# Patient Record
Sex: MALE | Race: ASIAN | NOT HISPANIC OR LATINO | Employment: STUDENT | URBAN - METROPOLITAN AREA
[De-identification: names, ages, dates, MRNs, and addresses within clinical notes are randomized per-mention and may not be internally consistent; named-entity substitution may affect disease eponyms.]

---
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
## 2022-06-25 ENCOUNTER — HEALTH MAINTENANCE LETTER (OUTPATIENT)
Age: 22
End: 2022-06-25

## 2022-11-20 ENCOUNTER — HEALTH MAINTENANCE LETTER (OUTPATIENT)
Age: 22
End: 2022-11-20

## 2023-07-08 ENCOUNTER — HEALTH MAINTENANCE LETTER (OUTPATIENT)
Age: 23
End: 2023-07-08

## 2023-09-02 ENCOUNTER — APPOINTMENT (OUTPATIENT)
Dept: GENERAL RADIOLOGY | Facility: CLINIC | Age: 23
End: 2023-09-02
Attending: STUDENT IN AN ORGANIZED HEALTH CARE EDUCATION/TRAINING PROGRAM
Payer: COMMERCIAL

## 2023-09-02 ENCOUNTER — HOSPITAL ENCOUNTER (EMERGENCY)
Facility: CLINIC | Age: 23
Discharge: HOME | End: 2023-09-03
Attending: STUDENT IN AN ORGANIZED HEALTH CARE EDUCATION/TRAINING PROGRAM | Admitting: STUDENT IN AN ORGANIZED HEALTH CARE EDUCATION/TRAINING PROGRAM
Payer: COMMERCIAL

## 2023-09-02 VITALS
TEMPERATURE: 98.3 F | DIASTOLIC BLOOD PRESSURE: 75 MMHG | OXYGEN SATURATION: 97 % | SYSTOLIC BLOOD PRESSURE: 112 MMHG | WEIGHT: 160 LBS | RESPIRATION RATE: 16 BRPM | BODY MASS INDEX: 22.96 KG/M2 | HEART RATE: 120 BPM

## 2023-09-02 DIAGNOSIS — M25.531 RIGHT WRIST PAIN: ICD-10-CM

## 2023-09-02 PROCEDURE — 99283 EMERGENCY DEPT VISIT LOW MDM: CPT | Mod: 27

## 2023-09-02 PROCEDURE — 99283 EMERGENCY DEPT VISIT LOW MDM: CPT | Performed by: STUDENT IN AN ORGANIZED HEALTH CARE EDUCATION/TRAINING PROGRAM

## 2023-09-02 PROCEDURE — 73110 X-RAY EXAM OF WRIST: CPT | Mod: RT

## 2023-09-02 PROCEDURE — 73130 X-RAY EXAM OF HAND: CPT | Mod: RT

## 2023-09-02 PROCEDURE — 73130 X-RAY EXAM OF HAND: CPT | Mod: 26 | Performed by: RADIOLOGY

## 2023-09-02 PROCEDURE — 73110 X-RAY EXAM OF WRIST: CPT | Mod: 26 | Performed by: RADIOLOGY

## 2023-09-03 ENCOUNTER — HOSPITAL ENCOUNTER (EMERGENCY)
Facility: CLINIC | Age: 23
Discharge: HOME | End: 2023-09-03
Attending: EMERGENCY MEDICINE
Payer: COMMERCIAL

## 2023-09-03 ENCOUNTER — APPOINTMENT (OUTPATIENT)
Dept: CT IMAGING | Facility: CLINIC | Age: 23
End: 2023-09-03
Attending: EMERGENCY MEDICINE
Payer: COMMERCIAL

## 2023-09-03 VITALS
HEART RATE: 85 BPM | DIASTOLIC BLOOD PRESSURE: 68 MMHG | OXYGEN SATURATION: 95 % | SYSTOLIC BLOOD PRESSURE: 107 MMHG | TEMPERATURE: 98.2 F | RESPIRATION RATE: 16 BRPM

## 2023-09-03 DIAGNOSIS — S62.124A CLOSED NONDISPLACED FRACTURE OF LUNATE OF RIGHT WRIST, INITIAL ENCOUNTER: ICD-10-CM

## 2023-09-03 PROCEDURE — 99284 EMERGENCY DEPT VISIT MOD MDM: CPT | Mod: 25 | Performed by: EMERGENCY MEDICINE

## 2023-09-03 PROCEDURE — 73200 CT UPPER EXTREMITY W/O DYE: CPT | Mod: RT

## 2023-09-03 PROCEDURE — 25630 CLTX CARPL FX W/O MNPJ EA B1: CPT | Mod: RT | Performed by: EMERGENCY MEDICINE

## 2023-09-03 PROCEDURE — 250N000013 HC RX MED GY IP 250 OP 250 PS 637: Performed by: EMERGENCY MEDICINE

## 2023-09-03 PROCEDURE — 73200 CT UPPER EXTREMITY W/O DYE: CPT | Mod: 26 | Performed by: RADIOLOGY

## 2023-09-03 PROCEDURE — 25630 CLTX CARPL FX W/O MNPJ EA B1: CPT | Mod: 54 | Performed by: EMERGENCY MEDICINE

## 2023-09-03 PROCEDURE — 99283 EMERGENCY DEPT VISIT LOW MDM: CPT | Mod: 57 | Performed by: EMERGENCY MEDICINE

## 2023-09-03 RX ORDER — DIPHENHYDRAMINE HYDROCHLORIDE 50 MG/ML
25 INJECTION INTRAMUSCULAR; INTRAVENOUS ONCE
Status: DISCONTINUED | OUTPATIENT
Start: 2023-09-03 | End: 2023-09-03

## 2023-09-03 RX ORDER — ACETAMINOPHEN 500 MG
1000 TABLET ORAL ONCE
Status: COMPLETED | OUTPATIENT
Start: 2023-09-03 | End: 2023-09-03

## 2023-09-03 RX ADMIN — ACETAMINOPHEN 1000 MG: 500 TABLET ORAL at 05:41

## 2023-09-03 ASSESSMENT — ACTIVITIES OF DAILY LIVING (ADL)
ADLS_ACUITY_SCORE: 33
ADLS_ACUITY_SCORE: 33

## 2023-09-03 NOTE — DISCHARGE INSTRUCTIONS
Please make an appointment to follow up with [facility] (phone: [phone]) as soon as possible.    Do not take the splint off until you see orthopedic surgery    Return to the department for any further concerns.    If Raoul has discomfort from fever or other pain, he can have:  Acetaminophen (Tylenol) every 6 hours as needed. His dose is:    2 regular strength tabs (650 mg)                                     (43.2+ kg/96+ lb)    NOTE: If your acetaminophen (Tylenol) came with a dropper marked with 0.4 and 0.8 ml, call us ([phone]) or check with your doctor about the dose before using it.     AND/OR      Ibuprofen (Advil, Motrin) every 6 hours as needed. His/her dose is:    2 regular strength tabs (400 mg)                                                                         (40-60 kg/ lb)

## 2023-09-03 NOTE — ED TRIAGE NOTES
Patient was seen earlier tonight for right wrist pain and eloped and now checking back in due to pain. Would like to get CT scan that was previously scheduled but patient eloped before it was done

## 2023-09-03 NOTE — ED PROVIDER NOTES
[place] EMERGENCY DEPARTMENT ([facility])    9/02/23         History     Chief Complaint   Patient presents with    Wrist Pain     HPI  Raoul Schroeder is a 23 year old male who with PMH of seasonal allergic rhinitis, and eczema presents to the ED for wrist pain.  Patient reports he punched plexiglass and has pain in his right hand and wrist.  Maximal pain over the medial side of his right wrist.  Small abrasion over the area.  Denies there is any chance this could be a fight bite.  No other pain or injury reported.             Physical Exam   BP: 112/75  Pulse: 120  Temp: 98.3  F (36.8  C)  Resp: 16  Weight: 72.6 kg (160 lb)  SpO2: 97 %  Physical Exam  General: No acute distress. Appears stated age.   HENT: MMM, no oropharyngeal lesions  Eyes: PERRL, normal sclerae   Cardio: Regular rate, extremities well perfused  Resp: Normal work of breathing, normal respiratory rate  Neuro: alert and fully oriented. CN II-XII grossly intact. Grossly normal strength and sensation in all extremities.   MSK: Swelling over second and third metacarpal head with small abrasion overlying.  Tenderness with movement and right wrist, maximal tenderness over medial aspect, concern for possible scaphoid injury  Integumentary/Skin: no rash, normal color  Psych: normal affect, normal behavior      ED Course, Procedures, & Data      Procedures                      Results for orders placed or performed during the hospital encounter of 09/02/23   XR Hand Right G/E 3 Views     Status: None    Narrative    EXAM: XR HAND RIGHT G/E 3 VIEWS  LOCATION: [facility]  DATE: 9/2/2023    INDICATION: punched plexi glass  COMPARISON: None.      Impression    IMPRESSION: Normal joint spaces and alignment. No fracture.   XR Wrist Right G/E 3 Views     Status: None    Narrative    EXAM: XR WRIST RIGHT G/E 3 VIEWS  LOCATION: [facility]  DATE:  9/2/2023    INDICATION: punched plexi glass  COMPARISON: None.      Impression    IMPRESSION: Linear lucency over the scaphoid only identified on the lateral view, possibly projectional. Correlate for focal tenderness. If there is sufficient concern for scaphoid fracture, consider additional views or CT.    No radiopaque or foreign object.     Medications   Tdap (tetanus-diphtheria-acell pertussis) (ADACEL) injection 0.5 mL (0.5 mLs Intramuscular Not Given 9/3/23 0027)     Labs Ordered and Resulted from Time of ED Arrival to Time of ED Departure - No data to display  XR Wrist Right G/E 3 Views   Final Result   IMPRESSION: Linear lucency over the scaphoid only identified on the lateral view, possibly projectional. Correlate for focal tenderness. If there is sufficient concern for scaphoid fracture, consider additional views or CT.      No radiopaque or foreign object.      XR Hand Right G/E 3 Views   Final Result   IMPRESSION: Normal joint spaces and alignment. No fracture.             Critical care was not performed.     Medical Decision Making  The patient's presentation was of moderate complexity (an acute complicated injury).    The patient's evaluation involved:  strong consideration of a test (see separate area of note for details) that was ultimately deferred  ordering and/or review of 2 test(s) in this encounter (see separate area of note for details)  independent interpretation of testing performed by another health professional (see separate area of note for details)    The patient's management necessitated moderate risk (a decision regarding minor procedure (splint) with risk factors of none).    Assessment & Plan    Raoul Schroeder is a 23-year-old male presenting with injury to right hand after punching plexiglass.  Offered to update tetanus which patient declined.  He arrives tachycardic but otherwise vital signs within normal limits.  Denies this could be a fight bite.  X-rays obtained of his right hand  and wrist.  There is a lucency on one view over the scaphoid, could be potential scaphoid injury.  Patient does have some tenderness in this area.  Radiology recommending possible CT for further evaluation discussed this with patient when she initially agreed to.  Went to discuss further with patient and offer empiric splinting and patient had eloped.  Called him on the phone to further explain my concerns about a possible scaphoid injury and the risks of leaving this untreated.  He states that he will return to the emergency department tomorrow morning to get it splinted.  I did recommend if he was near pharmacy he could get a prefabricated splint in the meantime.  Referral to orthopedics placed.  Asked patient to return for splint.    I have reviewed the nursing notes. I have reviewed the findings, diagnosis, plan and need for follow up with the patient.    New Prescriptions    No medications on file       Final diagnoses:   Right wrist pain       Rosemarie Duffy MD  [facility] EMERGENCY DEPARTMENT  9/2/2023     Rosemarie Duffy MD  09/03/23 0133

## 2023-09-03 NOTE — ED TRIAGE NOTES
Right wrist and knuckle injury after punching plexi-glass at a bar 3 hours ago.   CMS intact.      Triage Assessment       Row Name 09/02/23 2742       Triage Assessment (Adult)    Airway WDL WDL       Respiratory WDL    Respiratory WDL WDL       Skin Circulation/Temperature WDL    Skin Circulation/Temperature WDL WDL       Cardiac WDL    Cardiac WDL WDL       Peripheral/Neurovascular WDL    Peripheral Neurovascular WDL WDL       Cognitive/Neuro/Behavioral WDL    Cognitive/Neuro/Behavioral WDL WDL

## 2023-09-03 NOTE — ED PROVIDER NOTES
ED Provider Note  [facility]      History     Chief Complaint   Patient presents with    Wrist Pain     HPI  Raoul Schroeder is a 23 year old male who was recently here for a hand injury.  He was recommended to get a CT however he said he wanted to leave.  He returned asking to get the CT done.  He states there is no new injuries.    Past Medical History  Past Medical History:   Diagnosis Date    Dyschromia, unspecified     Other developmental speech or language disorder      Past Surgical History:   Procedure Laterality Date    CIRCUMCISION N/A 10/18/2021    Procedure: CIRCUMCISION;  Surgeon: Lukasz Cantrell MD;  Location: [facility] OR     No current outpatient medications on file.    No Known Allergies  Family History  History reviewed. No pertinent family history.  Social History   Social History     Tobacco Use    Smoking status: Every Day     Types: Cigarettes, Vaping Device    Smokeless tobacco: Never    Tobacco comments:     dad   Vaping Use    Vaping Use: Never used   Substance Use Topics    Alcohol use: No     Alcohol/week: 0.0 standard drinks of alcohol    Drug use: No         A medically appropriate review of systems was performed with pertinent positives and negatives noted in the HPI, and all other systems negative.    Physical Exam   BP: 107/68  Pulse: 85  Temp: 98.2  F (36.8  C)  Resp: 16  SpO2: 95 %  Physical Exam  Physical Exam   Constitutional: oriented to person, place, and time. appears well-developed and well-nourished.   HENT:   Head: Normocephalic and atraumatic.   Neck: Normal range of motion.   Pulmonary/Chest: Effort normal. No respiratory distress.   Cardiac: No murmurs, rubs, gallops. RRR.  Abdominal: Abdomen soft, nontender, nondistended. No rebound tenderness.  MSK: There is some right hand swelling over the first MCP, very minimal tenderness over the snuffbox of the right hand, range of motion normal, radial pulses symmetric.  Neurological: alert and  oriented to person, place, and time.   Skin: Skin is warm and dry.   Psychiatric:  normal mood and affect.  behavior is normal. Thought content normal.       ED Course, Procedures, & Data      Procedures            Results for orders placed or performed during the hospital encounter of 09/02/23   XR Hand Right G/E 3 Views     Status: None    Narrative    EXAM: XR HAND RIGHT G/E 3 VIEWS  LOCATION: [facility]  DATE: 9/2/2023    INDICATION: punched plexi glass  COMPARISON: None.      Impression    IMPRESSION: Normal joint spaces and alignment. No fracture.   XR Wrist Right G/E 3 Views     Status: None    Narrative    EXAM: XR WRIST RIGHT G/E 3 VIEWS  LOCATION: [facility]  DATE: 9/2/2023    INDICATION: punched plexi glass  COMPARISON: None.      Impression    IMPRESSION: Linear lucency over the scaphoid only identified on the lateral view, possibly projectional. Correlate for focal tenderness. If there is sufficient concern for scaphoid fracture, consider additional views or CT.    No radiopaque or foreign object.     Medications - No data to display  Labs Ordered and Resulted from Time of ED Arrival to Time of ED Departure - No data to display  No orders to display          Critical care was not performed.     Medical Decision Making  The patient's presentation was of low complexity (an acute and uncomplicated illness or injury).    The patient's evaluation involved:  ordering and/or review of 1 test(s) in this encounter (see separate area of note for details)  independent interpretation of testing performed by another health professional (CT with lunate fracture)    The patient's management necessitated only low risk treatment.    Assessment & Plan    MDM  Patient is presenting again with wrist pain.  We did a CT scan which showed a nondisplaced lunate fracture.  He has minimal discomfort.  He is placed in a rigid thumb spica  splint.  Discussed to not remove it until he sees orthopedic surgery within a week.  He is given referral and phone number.  Discussed simple measures such as Tylenol ibuprofen and ice which he said should be okay for his pain.  Hand is otherwise neurovascular intact.    I have reviewed the nursing notes. I have reviewed the findings, diagnosis, plan and need for follow up with the patient.    New Prescriptions    No medications on file       Final diagnoses:   Closed nondisplaced fracture of lunate of right wrist, initial encounter       Clay Perry  [facility] EMERGENCY DEPARTMENT  9/3/2023     Clay Perry MD  09/03/23 0658

## 2023-09-05 ENCOUNTER — OFFICE VISIT (OUTPATIENT)
Dept: ORTHOPEDICS | Facility: CLINIC | Age: 23
End: 2023-09-05
Attending: STUDENT IN AN ORGANIZED HEALTH CARE EDUCATION/TRAINING PROGRAM
Payer: COMMERCIAL

## 2023-09-05 ENCOUNTER — TELEPHONE (OUTPATIENT)
Dept: ORTHOPEDICS | Facility: CLINIC | Age: 23
End: 2023-09-05

## 2023-09-05 VITALS — WEIGHT: 155 LBS | HEIGHT: 70 IN | BODY MASS INDEX: 22.19 KG/M2

## 2023-09-05 DIAGNOSIS — S62.121A CLOSED DISPLACED FRACTURE OF LUNATE OF RIGHT WRIST, INITIAL ENCOUNTER: ICD-10-CM

## 2023-09-05 PROCEDURE — 99203 OFFICE O/P NEW LOW 30 MIN: CPT | Performed by: PEDIATRICS

## 2023-09-05 NOTE — TELEPHONE ENCOUNTER
DIAGNOSIS: Closed displaced fracture of lunate of right wrist, initial encounter [S62.121A]   APPOINTMENT DATE: 09/06/2023   NOTES STATUS DETAILS   OFFICE NOTE from referring provider Internal 09/05/2023 - Dom Camilo DO - [facility]   DISCHARGE REPORT from the ER Internal 09/03/2023, 09/02/2023 - [facility] ED   CT SCAN Internal    XRAYS (IMAGES & REPORTS) Internal

## 2023-09-05 NOTE — PROGRESS NOTES
ASSESSMENT & PLAN    Raoul was seen today for injury.    Diagnoses and all orders for this visit:    Closed displaced fracture of lunate of right wrist, initial encounter  -     Orthopedic  Referral  -     Orthopedic  Referral; Future      In my opinion there is some impaction at the fracture, some displacement.  Scheduled appointment just earlier this morning, for today.  Given living and attending school in [place], for convenience alone can have him follow-up at [facility]. We also discussed potential for me to reach out to ortho hand/wrist specialist if continuing care here, but with intent for follow-up elsewhere, will forgo that for now.  Discussed continuing with current support, given relatively comfortable with that.  Questions answered. Discussed signs and symptoms that may indicate more serious issues; the patient was instructed to seek appropriate care if noted. Raoul indicates understanding of these issues and agrees with the plan.      See Patient Instructions  Patient Instructions   Reviewed nature of the injury, with an impacted lunate fracture.  This fracture is intraarticular and with some impaction.  Discussed continued immobilization for the injury, and follow-up.  Given currently at [employer] for school and living, along with nature of the injury, will refer on to orthopedic hand/wrist specialty for further evaluation and care.  Advise continue with current brace routinely in the meantime.  Provided a letter for work, with limited use of the right upper extremity.  Follow-up here is open ended.    If you have any further questions for your physician or physician s care team you can contact them thru MyChart or by calling [phone].      Dom Camilo [facility]    -----  Chief Complaint   Patient presents with    Right Wrist - Injury       SUBJECTIVE  Raoul Schroeder is a/an 23 year old male who is seen as an ER referral for evaluation  "of Right wrist.     The patient is seen by themselves.  The patient is Right handed    Onset: 3 day(s) ago. Patient describes injury as punching the glass of a punching bag game at the bar.  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  Additional history:  Injury 9/2/23.          REVIEW OF SYSTEMS:  Review of Systems    OBJECTIVE:  Ht 1.778 m (5' 10\")   Wt 70.3 kg (155 lb)   BMI 22.24 kg/m     General: healthy, alert and in no distress  Skin: no suspicious lesions or rash.  CV: distal perfusion intact   Resp: normal respiratory effort without conversational dyspnea   Psych: normal mood and affect  Gait: NORMAL  Neuro: Normal light sensory exam of extremity       Hand/wrist (right):    Inspection:  No deformity noted.  + hand/wrist swelling, more dorsally. + ecchymosis.    Motion:  Forearm pronation , forearm supination limited.  Wrist flexion limited  Wrist extension limited  Digit motion grossly full    Strength:  deferred    Sensation:  Grossly intact light touch, some subjective sensation change in ring and small fingers.    Radial pulses normal, +2/4, capillary refill brisk.        RADIOLOGY:  Final results and radiologist's interpretation, available in the [facility] health record.  Images were reviewed with the patient in the office today.  My personal interpretation of the performed imaging:   XR with lucency at level of lunate, noted best on lateral view.  CT with impacted lunate fracture; to me, appears some depression of up to 1 mm or more.        EXAM: CT WRIST RIGHT W/O CONTRAST  LOCATION: [facility]  DATE: 9/3/2023     INDICATION: Wrist pain, unchanged likely glass, question scaphoid fracture  COMPARISON: Radiographs 09/02/2023  TECHNIQUE: Noncontrast. Axial, sagittal and coronal thin-section reconstruction. Dose reduction techniques were used.      FINDINGS:      BONES:  -Nondisplaced fracture, volar aspect of the lunate. No scaphoid fracture. No dislocation.     SOFT TISSUES:  -Normal.                                                                      IMPRESSION:  1.  Nondisplaced fracture of the lunate.              EXAM: XR WRIST RIGHT G/E 3 VIEWS  LOCATION: [facility]  DATE: 9/2/2023     INDICATION: punched plexi glass  COMPARISON: None.                                                                      IMPRESSION: Linear lucency over the scaphoid only identified on the lateral view, possibly projectional. Correlate for focal tenderness. If there is sufficient concern for scaphoid fracture, consider additional views or CT.     No radiopaque or foreign object.                EXAM: XR HAND RIGHT G/E 3 VIEWS  LOCATION: [facility]  DATE: 9/2/2023     INDICATION: punched plexi glass  COMPARISON: None.                                                                      IMPRESSION: Normal joint spaces and alignment. No fracture.              Review of prior external note(s) from - ED  Review of the result(s) of each unique test - imaging  Independent interpretation of a test performed by another physician/other qualified health care professional (not separately reported) - imaging

## 2023-09-05 NOTE — TELEPHONE ENCOUNTER
[facility] Call Center    Phone Message    May a detailed message be left on voicemail: yes     Reason for Call:  Closed displaced fracture of lunate of right wrist, initial encounter [F08.617A] .. Please Assist With Doctor    Action Taken: Message routed to:  [facility] ([facility]): iman    Travel Screening: Not Applicable

## 2023-09-05 NOTE — LETTER
9/5/2023         RE: Raoul Schroeder  [address]  [place] [address]        Dear Colleague,    Thank you for referring your patient, Raoul Schroeder, to the [facility]. Please see a copy of my visit note below.    ASSESSMENT & PLAN    Raoul was seen today for injury.    Diagnoses and all orders for this visit:    Closed displaced fracture of lunate of right wrist, initial encounter  -     Orthopedic  Referral  -     Orthopedic  Referral; Future      In my opinion there is some impaction at the fracture, some displacement.  Scheduled appointment just earlier this morning, for today.  Given living and attending school in [place], for convenience alone can have him follow-up at [facility]. We also discussed potential for me to reach out to ortho hand/wrist specialist if continuing care here, but with intent for follow-up elsewhere, will forgo that for now.  Discussed continuing with current support, given relatively comfortable with that.  Questions answered. Discussed signs and symptoms that may indicate more serious issues; the patient was instructed to seek appropriate care if noted. Raoul indicates understanding of these issues and agrees with the plan.      See Patient Instructions  Patient Instructions   Reviewed nature of the injury, with an impacted lunate fracture.  This fracture is intraarticular and with some impaction.  Discussed continued immobilization for the injury, and follow-up.  Given currently at [employer] for school and living, along with nature of the injury, will refer on to orthopedic hand/wrist specialty for further evaluation and care.  Advise continue with current brace routinely in the meantime.  Provided a letter for work, with limited use of the right upper extremity.  Follow-up here is open ended.    If you have any further questions for your physician or physician s care team you can contact them thru MyChart or by calling  "[phone].      Dom Camilo [facility]    -----  Chief Complaint   Patient presents with     Right Wrist - Injury       SUBJECTIVE  Raoul Schroeder is a/an 23 year old male who is seen as an ER referral for evaluation of Right wrist.     The patient is seen by themselves.  The patient is Right handed    Onset: 3 day(s) ago. Patient describes injury as punching the glass of a punching bag game at the bar.  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  Additional history:  Injury 9/2/23.          REVIEW OF SYSTEMS:  Review of Systems    OBJECTIVE:  Ht 1.778 m (5' 10\")   Wt 70.3 kg (155 lb)   BMI 22.24 kg/m     General: healthy, alert and in no distress  Skin: no suspicious lesions or rash.  CV: distal perfusion intact   Resp: normal respiratory effort without conversational dyspnea   Psych: normal mood and affect  Gait: NORMAL  Neuro: Normal light sensory exam of extremity       Hand/wrist (right):    Inspection:  No deformity noted.  + hand/wrist swelling, more dorsally. + ecchymosis.    Motion:  Forearm pronation , forearm supination limited.  Wrist flexion limited  Wrist extension limited  Digit motion grossly full    Strength:  deferred    Sensation:  Grossly intact light touch, some subjective sensation change in ring and small fingers.    Radial pulses normal, +2/4, capillary refill brisk.        RADIOLOGY:  Final results and radiologist's interpretation, available in the [facility] health record.  Images were reviewed with the patient in the office " today.  My personal interpretation of the performed imaging:   XR with lucency at level of lunate, noted best on lateral view.  CT with impacted lunate fracture; to me, appears some depression of up to 1 mm or more.        EXAM: CT WRIST RIGHT W/O CONTRAST  LOCATION: [facility]  DATE: 9/3/2023     INDICATION: Wrist pain, unchanged likely glass, question scaphoid fracture  COMPARISON: Radiographs 09/02/2023  TECHNIQUE: Noncontrast. Axial, sagittal and coronal thin-section reconstruction. Dose reduction techniques were used.      FINDINGS:      BONES:  -Nondisplaced fracture, volar aspect of the lunate. No scaphoid fracture. No dislocation.     SOFT TISSUES:  -Normal.                                                                      IMPRESSION:  1.  Nondisplaced fracture of the lunate.              EXAM: XR WRIST RIGHT G/E 3 VIEWS  LOCATION: [facility]  DATE: 9/2/2023     INDICATION: punched plexi glass  COMPARISON: None.                                                                      IMPRESSION: Linear lucency over the scaphoid only identified on the lateral view, possibly projectional. Correlate for focal tenderness. If there is sufficient concern for scaphoid fracture, consider additional views or CT.     No radiopaque or foreign object.                EXAM: XR HAND RIGHT G/E 3 VIEWS  LOCATION: [facility]  DATE: 9/2/2023     INDICATION: punched plexi glass  COMPARISON: None.                                                                      IMPRESSION: Normal joint spaces and alignment. No fracture.              Review of prior external note(s) from - ED  Review of the result(s) of each unique test - imaging  Independent interpretation of a test performed by another physician/other qualified health care professional (not separately reported) - imaging             Again,  thank you for allowing me to participate in the care of your patient.        Sincerely,        Dom Camilo, DO

## 2023-09-05 NOTE — LETTER
September 5, 2023      Raoul Schroeder  [address]  [place] [address]        To Whom It May Concern:    Raoul Schroeder was seen in our clinic. He may return to work with the following: limited to light duty.  No independent lift/carry/push/pull with right hand. No reaching away from body with right hand/upper extremity.  May use right hand for light duty activities such as paperwork, writing, typing, if pain free.        Sincerely,            Dom Camilo, DO

## 2023-09-05 NOTE — PATIENT INSTRUCTIONS
Reviewed nature of the injury, with an impacted lunate fracture.  This fracture is intraarticular and with some impaction.  Discussed continued immobilization for the injury, and follow-up.  Given currently at [employer] for school and living, along with nature of the injury, will refer on to orthopedic hand/wrist specialty for further evaluation and care.  Advise continue with current brace routinely in the meantime.  Provided a letter for work, with limited use of the right upper extremity.  Follow-up here is open ended.    If you have any further questions for your physician or physician s care team you can contact them thru [place] or by calling [phone].

## 2023-09-05 NOTE — TELEPHONE ENCOUNTER
Orthopedic/Sports Medicine Fracture Triage    Incoming call/or message from call center member.    Fracture type: Hand.    The patient is in a  nothing.    Date of injury 9/3/23.    Triaged by: Rosemarie Rodriguez .    Determined to be managed Surgically.    Needs to be seen within 1 week.    Additional Comments/information: Patient scheduled with Dr Torres at 10:20am on 9/6/23    KALEY MOHR ATC

## 2023-09-06 ENCOUNTER — TELEPHONE (OUTPATIENT)
Dept: ORTHOPEDICS | Facility: CLINIC | Age: 23
End: 2023-09-06

## 2023-09-06 ENCOUNTER — PRE VISIT (OUTPATIENT)
Dept: ORTHOPEDICS | Facility: CLINIC | Age: 23
End: 2023-09-06

## 2023-09-06 ENCOUNTER — OFFICE VISIT (OUTPATIENT)
Dept: ORTHOPEDICS | Facility: CLINIC | Age: 23
End: 2023-09-06
Payer: COMMERCIAL

## 2023-09-06 DIAGNOSIS — S62.124A CLOSED NONDISPLACED FRACTURE OF LUNATE OF RIGHT WRIST, INITIAL ENCOUNTER: Primary | ICD-10-CM

## 2023-09-06 PROCEDURE — 99203 OFFICE O/P NEW LOW 30 MIN: CPT | Mod: 25 | Performed by: ORTHOPAEDIC SURGERY

## 2023-09-06 PROCEDURE — 29075 APPL CST ELBW FNGR SHORT ARM: CPT | Mod: RT | Performed by: ORTHOPAEDIC SURGERY

## 2023-09-06 NOTE — TELEPHONE ENCOUNTER
Attempted to contact patient, no answer, no voicemail.  Scheduled patient today at 1020 am with Dr. Torres.

## 2023-09-06 NOTE — TELEPHONE ENCOUNTER
Attempted to reach patient, no voicemail on personal phone #[phone].  Left message with patient's mother on home phone number to give us a call back to reschedule his appointment to be seen today by Dr. Torres.

## 2023-09-06 NOTE — NURSING NOTE
Reason For Visit:   Chief Complaint   Patient presents with    Consult     Closed displaced fracture of lunate of right wrist  DOI: 9/2/23       Primary MD: Bobbi Caldwell  Ref. MD: Ton    Age: 23 year old    ?  No      There were no vitals taken for this visit.      Pain Assessment  Patient Currently in Pain: Yes  0-10 Pain Scale: 3  Primary Pain Location: Wrist (Right)  Pain Descriptors: Aching, Dull, Constant    Hand Dominance Evaluation  Hand Dominance: Right          QuickDASH Assessment       No data to display                   No current outpatient medications on file.       No Known Allergies    KALEY MOHR, ATC

## 2023-09-06 NOTE — PROGRESS NOTES
Orthopaedic Surgery Consultation  9/6/2023 10:56 AM   by Be Torres MD    Raoul Schroeder MRN# [id number]   Age: 23 year old YOB: 2000     Reason for consult: Right wrist pain                       Assessment and Plan:   Assessment:   Intra-articular, minimally displaced lunate fracture, right wrist      Plan:   Raoul and I reviewed his x-rays today, we discussed surgical and nonsurgical options.  I would not recommend any surgery directed towards his lunate at this point.  We did discuss that he may develop some midcarpal arthritis down the road.  But I think we treat this nonoperatively.  I recommended a cast and activity modifications.  He was placed in a well-padded short arm cast today.  We went over activity modifications for now.  I will see him back in 2 weeks, new x-rays of the right wrist, AP, lateral, oblique.              History of Present Illness:   23 year old male with right wrist pain.  He punched a punching bag and hit the glass wall behind the punching bag over the weekend.  He was seen in the clinic, diagnosed with a lunate fracture, casted, no reduction.  He complains of right wrist pain, denies any other injuries, denies numbness or tingling.  He is otherwise healthy.  Right-handed.  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           Past Medical History:     Patient Active Problem List   Diagnosis    Dyshydrotic eczema- hands    Seasonal allergic rhinitis    Vision problem    Back pain     Past Medical History:   Diagnosis Date    Dyschromia, unspecified     Other developmental speech or language disorder             Past Surgical History:   Relevant surgical history as mentioned in HPI.  Past Surgical History:   Procedure Laterality Date    CIRCUMCISION N/A 10/18/2021    Procedure: CIRCUMCISION;  Surgeon: Lukasz Cantrell MD;  Location: [facility]            Social History:       Smoking, EtOH,        Family History:   No family history on  file.  No history of problems with bleeding or anesthesia       Allergies:   No Known Allergies       Medications:     No current outpatient medications on file.     No current facility-administered medications for this visit.             Review of Systems:   A comprehensive 10 point review of systems (constitutional, ENT, cardiac, peripheral vascular, lymphatic, respiratory, GI, , Musculoskeletal, skin, Neurological) was performed and found to be negative except as described in this note.           Physical Exam:     EXAMINATION pertinent findings:   VITAL SIGNS: There were no vitals taken for this visit.  There is no height or weight on file to calculate BMI.  RESP: non labored breathing   CARD: comfortable, no acute distress  ABD: benign   Examination of the right wrist out of the splint demonstrates tenderness directly over the lunate, slightly reduced range of motion but no crepitance, no instability, no tenderness in the scaphoid, no tenderness on the ulnar side of the wrist.  Bilaterally, no motor, no sensory deficits are noted.  No significant atrophy.  There is brisk capillary refill in all digits and a palpable radial pulse.  No overlying skin changes noted.            Data:     All laboratory data reviewed  All imaging studies reviewed by me.  Pertinent Imaging and Lab Review: I did review right wrist x-rays, AP, lateral, oblique.  Also a scan of the right wrist was reviewed.  He has a lunate fracture, distal volar lip of the lunate, minimally displaced, less than 2 mm.      Total Time = 20 min, 50% of which was spent in counseling and coordination of care as documented above.    Be Torres MD  Orthopedic Surgery, Upper Extremity  Cell [phone]

## 2023-09-06 NOTE — TELEPHONE ENCOUNTER
[facility] Call Center    Phone Message    May a detailed message be left on voicemail: yes     Reason for Call: Other: Patient is calling because he needs to reschedule his appointment for Closed nondisplaced fracture of lunate of right wrist. No appointments available this week. Please call patient.     Action Taken: Other: [id number], [id number]    Travel Screening: Not Applicable

## 2023-09-06 NOTE — TELEPHONE ENCOUNTER
[facility] Call Center    Phone Message    May a detailed message be left on voicemail: no     Reason for Call: Requesting c/b ASAP- patient has class in 30 minutes- he was trying to reschedule his appt w/Dr Torres, but wasn't sure if that is an option    Action Taken: Message routed to:  [facility]    Travel Screening: Not Applicable

## 2023-09-06 NOTE — LETTER
9/6/2023         RE: Raoul Schroeder  [address]  [place] [address]        Dear Colleague,    Thank you for referring your patient, Raoul Schroeder, to the [facility] [place]. Please see a copy of my visit note below.    Orthopaedic Surgery Consultation  9/6/2023 10:56 AM   by Be Torres MD    Raoul Schroeder MRN# [id number]   Age: 23 year old YOB: 2000     Reason for consult: Right wrist pain                       Assessment and Plan:   Assessment:   Intra-articular, minimally displaced lunate fracture, right wrist      Plan:   Raoul and I reviewed his x-rays today, we discussed surgical and nonsurgical options.  I would not recommend any surgery directed towards his lunate at this point.  We did discuss that he may develop some midcarpal arthritis down the road.  But I think we treat this nonoperatively.  I recommended a cast and activity modifications.  He was placed in a well-padded short arm cast today.  We went over activity modifications for now.  I will see him back in 2 weeks, new x-rays of the right wrist, AP, lateral, oblique.              History of Present Illness:   23 year old male with right wrist pain.  He punched a punching bag and hit the glass wall behind the punching bag over the weekend.  He was seen in the clinic, diagnosed with a lunate fracture, casted, no reduction.  He complains of right wrist pain, denies any other injuries, denies numbness or tingling.  He is otherwise healthy.  Right-handed.  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           Past Medical History:     Patient Active Problem List   Diagnosis    Dyshydrotic eczema- hands    Seasonal allergic rhinitis    Vision problem    Back pain     Past Medical History:   Diagnosis Date    Dyschromia, unspecified     Other developmental speech or language disorder             Past Surgical History:   Relevant surgical history as mentioned in HPI.  Past Surgical  History:   Procedure Laterality Date    CIRCUMCISION N/A 10/18/2021    Procedure: CIRCUMCISION;  Surgeon: Lukasz Cantrell MD;  Location: [facility]            Social History:       Smoking, EtOH,        Family History:   No family history on file.  No history of problems with bleeding or anesthesia       Allergies:   No Known Allergies       Medications:     No current outpatient medications on file.     No current facility-administered medications for this visit.             Review of Systems:   A comprehensive 10 point review of systems (constitutional, ENT, cardiac, peripheral vascular, lymphatic, respiratory, GI, , Musculoskeletal, skin, Neurological) was performed and found to be negative except as described in this note.           Physical Exam:     EXAMINATION pertinent findings:   VITAL SIGNS: There were no vitals taken for this visit.  There is no height or weight on file to calculate BMI.  RESP: non labored breathing   CARD: comfortable, no acute distress  ABD: benign   Examination of the right wrist out of the splint demonstrates tenderness directly over the lunate, slightly reduced range of motion but no crepitance, no instability, no tenderness in the scaphoid, no tenderness on the ulnar side of the wrist.  Bilaterally, no motor, no sensory deficits are noted.  No significant atrophy.  There is brisk capillary refill in all digits and a palpable radial pulse.  No overlying skin changes noted.            Data:     All laboratory data reviewed  All imaging studies reviewed by me.  Pertinent Imaging and Lab Review: I did review right wrist x-rays, AP, lateral, oblique.  Also a scan of the right wrist was reviewed.  He has a lunate fracture, distal volar lip of the lunate, minimally displaced, less than 2 mm.      Total Time = 20 min, 50% of which was spent in counseling and coordination of care as documented above.    Be Torres MD  Orthopedic Surgery, Upper Extremity  Cell [phone]

## 2023-09-06 NOTE — LETTER
September 6, 2023      Raoul Schroeder  [address]  [place] [address]        To Whom It May Concern,     Raoul Schroeder attended clinic here on Sep 6, 2023 and may return to school on 9/6/23.    If you have questions or concerns, please call the clinic at the number listed above.    Sincerely,         Be Torres MD

## 2023-09-15 DIAGNOSIS — S62.124A CLOSED NONDISPLACED FRACTURE OF LUNATE OF RIGHT WRIST, INITIAL ENCOUNTER: Primary | ICD-10-CM

## 2023-09-20 ENCOUNTER — OFFICE VISIT (OUTPATIENT)
Dept: ORTHOPEDICS | Facility: CLINIC | Age: 23
End: 2023-09-20
Payer: COMMERCIAL

## 2023-09-20 ENCOUNTER — ANCILLARY PROCEDURE (OUTPATIENT)
Dept: GENERAL RADIOLOGY | Facility: CLINIC | Age: 23
End: 2023-09-20
Attending: ORTHOPAEDIC SURGERY
Payer: COMMERCIAL

## 2023-09-20 DIAGNOSIS — S62.126D: Primary | ICD-10-CM

## 2023-09-20 DIAGNOSIS — S62.124A CLOSED NONDISPLACED FRACTURE OF LUNATE OF RIGHT WRIST, INITIAL ENCOUNTER: ICD-10-CM

## 2023-09-20 PROCEDURE — 29075 APPL CST ELBW FNGR SHORT ARM: CPT | Mod: RT | Performed by: ORTHOPAEDIC SURGERY

## 2023-09-20 PROCEDURE — 73110 X-RAY EXAM OF WRIST: CPT | Mod: RT | Performed by: RADIOLOGY

## 2023-09-20 PROCEDURE — 99213 OFFICE O/P EST LOW 20 MIN: CPT | Mod: 25 | Performed by: ORTHOPAEDIC SURGERY

## 2023-09-20 NOTE — PROGRESS NOTES
Cast/splint application    Date/Time: 9/6/2023 9:14 AM    Performed by: Grzegorz Bonilla ATC  Authorized by: Be Torres MD    Consent:     Consent obtained:  Verbal    Consent given by:  Patient    Risks discussed:  Discoloration, numbness, pain and swelling  Pre-procedure details:     Sensation:  Normal  Procedure details:     Laterality:  Right    Location:  Wrist    Wrist:  R wrist    Strapping: no      Cast type:  Short arm    Supplies:  Fiberglass  Post-procedure details:     Pain:  Unchanged    Sensation:  Normal    Patient tolerance of procedure:  Tolerated well, no immediate complications    Patient provided with cast or splint care instructions: Yes

## 2023-09-20 NOTE — PROGRESS NOTES
Cast/splint application    Date/Time: 9/20/2023 9:48 AM    Performed by: Grzegorz Bonilla ATC  Authorized by: Be Torres MD    Consent:     Consent obtained:  Verbal    Consent given by:  Patient    Risks discussed:  Discoloration, numbness, pain and swelling  Pre-procedure details:     Sensation:  Normal  Procedure details:     Laterality:  Right    Location:  Wrist    Wrist:  R wrist    Strapping: no      Cast type:  Short arm    Supplies:  Fiberglass  Post-procedure details:     Pain:  Unchanged    Sensation:  Normal    Patient tolerance of procedure:  Tolerated well, no immediate complications    Patient provided with cast or splint care instructions: Yes

## 2023-09-20 NOTE — NURSING NOTE
Reason For Visit:   Chief Complaint   Patient presents with    RECHECK     2 week follow-up Intra-articular, minimally displaced lunate fracture, right wrist  DOI: 9/2/23           Primary MD: Bobbi Caldwell  Ref. MD: Matthew    Age: 23 year old    ?  No      There were no vitals taken for this visit.      Pain Assessment  Patient Currently in Pain: Yes  0-10 Pain Scale: 2  Primary Pain Location: Wrist (Right)  Pain Descriptors: Intermittent    Hand Dominance Evaluation  Hand Dominance: Right          QuickDASH Assessment      9/20/2023     8:56 AM   QuickDASH [facility]   1. Open a tight or new jar Moderate difficulty   2. Do heavy household chores (e.g., wash walls, floors) Mild difficulty   3. Carry a shopping bag or briefcase Mild difficulty   4. Wash your back No difficulty   5. Use a knife to cut food Mild difficulty   6. Recreational activities in which you take some force or impact through your arm, shoulder or hand (e.g., golf, hammering, tennis, etc.) Moderate difficulty   7. During the past week, to what extent has your arm, shoulder or hand problem interfered with your normal social activities with family, friends, neighbours or groups Quite a bit   8. During the past week, were you limited in your work or other regular daily activities as a result of your arm, shoulder or hand problem Slightly limited   9. Arm, shoulder or hand pain Mild   10.Tingling (pins and needles) in your arm,shoulder or hand None   11. During the past week, how much difficulty have you had sleeping because of the pain in your arm, shoulder or hand No difficulty   Quickdash Ability Score 27.27          No current outpatient medications on file.       No Known Allergies    KALEY MOHR, ATC

## 2023-09-20 NOTE — PROGRESS NOTES
Orthopedic surgery follow-up note    Patient is a 23-year-old male who sustained a right minimally displaced lunate fracture on 9/3/2023 after punching an object.  He was seen approximately 2 weeks ago and was placed into a short arm cast for immobilization.  He states that he has been doing well since he was last seen.  He has very minimal pain.  He is able to make full composite fist without pain.  He has no concerns at this time is doing overall very well.  He states that his cast is comfortable.    Exam:  No acute distress.  Nonlabored breathing.  Regular rate and rhythm.  Short arm cast appears well fitting.  Able to make full composite fist.  Sensation intact to light touch in median, radial, ulnar nerve distributions.  Able to fire FPL, EPL, interossei.  Fingers warm and well-perfused.    Imaging:  3 view x-rays of right wrist showing sclerosis of the lunate.  No evidence of interval displacement.  Maintained alignment.  No evidence of acute osseous abnormality.    Assessment/plan:  23-year-old male with minimally displaced lunate fracture of the right wrist on 9/3/2023.  He is doing well.  Radiographs appear stable without any interval or subsequent displacement.  We will plan to keep immobilized for 3 weeks in his cast.  Cast was exchanged today.  We will see him back with cast off and repeat x-rays at that time.  Patient in agreement with plan.  All questions answered.    Patient seen and examined with Dr. Melissa Yao MD  Orthopedic Surgery, PGY-4

## 2023-09-20 NOTE — LETTER
9/20/2023         RE: Raoul Schroeder  [address]  [place] [address]        Dear Colleague,    Thank you for referring your patient, Raoul Schroeder, to the [facility] [place]. Please see a copy of my visit note below.    Orthopedic surgery follow-up note    Patient is a 23-year-old male who sustained a right minimally displaced lunate fracture on 9/3/2023 after punching an object.  He was seen approximately 2 weeks ago and was placed into a short arm cast for immobilization.  He states that he has been doing well since he was last seen.  He has very minimal pain.  He is able to make full composite fist without pain.  He has no concerns at this time is doing overall very well.  He states that his cast is comfortable.    Exam:  No acute distress.  Nonlabored breathing.  Regular rate and rhythm.  Short arm cast appears well fitting.  Able to make full composite fist.  Sensation intact to light touch in median, radial, ulnar nerve distributions.  Able to fire FPL, EPL, interossei.  Fingers warm and well-perfused.    Imaging:  3 view x-rays of right wrist showing sclerosis of the lunate.  No evidence of interval displacement.  Maintained alignment.  No evidence of acute osseous abnormality.    Assessment/plan:  23-year-old male with minimally displaced lunate fracture of the right wrist on 9/3/2023.  He is doing well.  Radiographs appear stable without any interval or subsequent displacement.  We will plan to keep immobilized for 3 weeks in his cast.  Cast was exchanged today.  We will see him back with cast off and repeat x-rays at that time.  Patient in agreement with plan.  All questions answered.    Patient seen and examined with Dr. Melissa Yao MD  Orthopedic Surgery, PGY-4    Cast/splint application    Date/Time: 9/20/2023 9:48 AM    Performed by: Grzegorz Bonilla ATC  Authorized by: Be Torres MD    Consent:     Consent obtained:  Verbal    Consent given  by:  Patient    Risks discussed:  Discoloration, numbness, pain and swelling  Pre-procedure details:     Sensation:  Normal  Procedure details:     Laterality:  Right    Location:  Wrist    Wrist:  R wrist    Strapping: no      Cast type:  Short arm    Supplies:  Fiberglass  Post-procedure details:     Pain:  Unchanged    Sensation:  Normal    Patient tolerance of procedure:  Tolerated well, no immediate complications    Patient provided with cast or splint care instructions: Yes            Be Torres MD

## 2023-10-05 DIAGNOSIS — S62.126D: Primary | ICD-10-CM

## 2023-10-06 ENCOUNTER — TRANSFERRED RECORDS (OUTPATIENT)
Dept: HEALTH INFORMATION MANAGEMENT | Facility: CLINIC | Age: 23
End: 2023-10-06
Payer: COMMERCIAL

## 2023-10-11 ENCOUNTER — ANCILLARY PROCEDURE (OUTPATIENT)
Dept: GENERAL RADIOLOGY | Facility: CLINIC | Age: 23
End: 2023-10-11
Attending: ORTHOPAEDIC SURGERY
Payer: COMMERCIAL

## 2023-10-11 ENCOUNTER — OFFICE VISIT (OUTPATIENT)
Dept: ORTHOPEDICS | Facility: CLINIC | Age: 23
End: 2023-10-11
Payer: COMMERCIAL

## 2023-10-11 DIAGNOSIS — S62.126D: Primary | ICD-10-CM

## 2023-10-11 DIAGNOSIS — S62.126D: ICD-10-CM

## 2023-10-11 PROCEDURE — 99213 OFFICE O/P EST LOW 20 MIN: CPT | Mod: GC | Performed by: ORTHOPAEDIC SURGERY

## 2023-10-11 PROCEDURE — 73110 X-RAY EXAM OF WRIST: CPT | Mod: RT | Performed by: RADIOLOGY

## 2023-10-11 NOTE — NURSING NOTE
Reason For Visit:   Chief Complaint   Patient presents with    RECHECK     3 week follow-up impacted lunate fracture of right hand DOI: 9/2/23       Primary MD: Bobbi Caldwell  Ref. MD: Matthew    Age: 23 year old    ?  No      There were no vitals taken for this visit.      Pain Assessment  Patient Currently in Pain: Yes  0-10 Pain Scale: 0 (no real pain at all)  Primary Pain Location: Hand (Right)    Hand Dominance Evaluation  Hand Dominance: Right          QuickDASH Assessment      10/11/2023     7:47 AM   QuickDASH [facility]   1. Open a tight or new jar Mild difficulty   2. Do heavy household chores (e.g., wash walls, floors) Mild difficulty   3. Carry a shopping bag or briefcase Mild difficulty   4. Wash your back No difficulty   5. Use a knife to cut food Moderate difficulty   6. Recreational activities in which you take some force or impact through your arm, shoulder or hand (e.g., golf, hammering, tennis, etc.) Moderate difficulty   7. During the past week, to what extent has your arm, shoulder or hand problem interfered with your normal social activities with family, friends, neighbours or groups Moderately   8. During the past week, were you limited in your work or other regular daily activities as a result of your arm, shoulder or hand problem Moderately limited   9. Arm, shoulder or hand pain Mild   10.Tingling (pins and needles) in your arm,shoulder or hand None   11. During the past week, how much difficulty have you had sleeping because of the pain in your arm, shoulder or hand Mild difficulty   Quickdash Ability Score 29.55          No current outpatient medications on file.       No Known Allergies    KALEY MOHR, ATC

## 2023-10-11 NOTE — PROGRESS NOTES
"DME FITTING    Relevant Diagnosis: Right impacted lunate fracture   Wrist, Quickfit, <10\" brace was fit on patient's Right wrist.     Person(s) involved in teaching:   Patient    Brace was applied in standard Manner:  Yes  Brace fit well:  Yes  Patient reports brace to fit comfortably:  Yes    Education:   Patient shown self application and removal of brace: Yes  Patient shown how to adjust brace fit, if necessary: Yes  Patient educated on billing and return policy: Yes  Patient confirmed understanding when and how to contact clinic with concerns: Yes    "

## 2023-10-11 NOTE — PROGRESS NOTES
Orthopedic surgery follow-up note    Patient is a 23-year-old male who sustained a right minimally displaced lunate fracture on 9/3/2023 after punching an object.  He was seen approximately 3 weeks ago and has been in a SAC for the last 5 weeks.  He states that he has been doing well since he was last seen.  He has no pain.  He is able to make full composite fist without pain. His wrist feels a bit stiff but he says he isnt limited in motion by pain.  He has no concerns at this time is doing overall very well.  He would like to get back to being a  and running.    Exam:  No acute distress.  Nonlabored breathing.  Regular rate and rhythm.  Dry skin from under cast but no appreciable wounds.  Able to make full composite fist.  Sensation intact to light touch in median, radial, ulnar nerve distributions.  Able to fire FPL, EPL, interossei. Wrist extension 30, flexion 45. Radial and ulnar deviation aroudn 20 degrees. No tenderness to palpation over the volar lunate.  Fingers warm and well-perfused.    Imaging:  3 view x-rays of right wrist obtained today and independently reviewed demonstrate no interval displacement of the known lunate fracture. There are some early signs of healing as fracture line appears less obvious.     Assessment/plan:  23-year-old male with minimally displaced lunate fracture of the right wrist on 9/3/2023.  He is doing well.  Radiographs appear stable without any interval or subsequent displacement. Plan to transition to brace and wean out of it over the next few weeks. May begin WB up to 5 lbs this week and slowly progress advancing 5 pounds per week until WBAT. Discussed with patient that he should avoid any activity that causes him wrist pain. We will see him back for repeat exam in 4-6 weeks. He may cancel if he is back to full function without symptoms. Encouraged ROM exercises and to work on these regularly to gain ROM.    Patient seen and examined with Dr. Melissa Duff  MD Mariano  Orthopaedic Surgery PGY-4

## 2023-10-11 NOTE — LETTER
10/11/2023         RE: Raoul Schroeder  [address]  [facility] [address]        Dear Colleague,    Thank you for referring your patient, Raoul Schroeder, to the [facility] [place]. Please see a copy of my visit note below.    Orthopedic surgery follow-up note    Patient is a 23-year-old male who sustained a right minimally displaced lunate fracture on 9/3/2023 after punching an object.  He was seen approximately 3 weeks ago and has been in a SAC for the last 5 weeks.  He states that he has been doing well since he was last seen.  He has no pain.  He is able to make full composite fist without pain. His wrist feels a bit stiff but he says he isnt limited in motion by pain.  He has no concerns at this time is doing overall very well.  He would like to get back to being a  and running.    Exam:  No acute distress.  Nonlabored breathing.  Regular rate and rhythm.  Dry skin from under cast but no appreciable wounds.  Able to make full composite fist.  Sensation intact to light touch in median, radial, ulnar nerve distributions.  Able to fire FPL, EPL, interossei. Wrist extension 30, flexion 45. Radial and ulnar deviation aroudn 20 degrees. No tenderness to palpation over the volar lunate.  Fingers warm and well-perfused.    Imaging:  3 view x-rays of right wrist obtained today and independently reviewed demonstrate no interval displacement of the known lunate fracture. There are some early signs of healing as fracture line appears less obvious.     Assessment/plan:  23-year-old male with minimally displaced lunate fracture of the right wrist on 9/3/2023.  He is doing well.  Radiographs appear stable without any interval or subsequent displacement. Plan to transition to brace and wean out of it over the next few weeks. May begin WB up to 5 lbs this week and slowly progress advancing 5 pounds per week until WBAT. Discussed with patient that he should avoid any activity that causes  "him wrist pain. We will see him back for repeat exam in 4-6 weeks. He may cancel if he is back to full function without symptoms. Encouraged ROM exercises and to work on these regularly to gain ROM.    Patient seen and examined with Dr. Melissa Quintana MD  Orthopaedic Surgery PGY-4      DME FITTING    Relevant Diagnosis: Right impacted lunate fracture   Wrist, Quickfit, <10\" brace was fit on patient's Right wrist.     Person(s) involved in teaching:   Patient    Brace was applied in standard Manner:  Yes  Brace fit well:  Yes  Patient reports brace to fit comfortably:  Yes    Education:   Patient shown self application and removal of brace: Yes  Patient shown how to adjust brace fit, if necessary: Yes  Patient educated on billing and return policy: Yes  Patient confirmed understanding when and how to contact clinic with concerns: Yes          Be Torres MD  "

## 2023-10-26 ENCOUNTER — TELEPHONE (OUTPATIENT)
Dept: SLEEP MEDICINE | Facility: CLINIC | Age: 23
End: 2023-10-26

## 2023-11-02 DIAGNOSIS — S62.126D: Primary | ICD-10-CM

## 2023-11-08 ENCOUNTER — ANCILLARY PROCEDURE (OUTPATIENT)
Dept: GENERAL RADIOLOGY | Facility: CLINIC | Age: 23
End: 2023-11-08
Attending: ORTHOPAEDIC SURGERY
Payer: COMMERCIAL

## 2023-11-08 ENCOUNTER — OFFICE VISIT (OUTPATIENT)
Dept: ORTHOPEDICS | Facility: CLINIC | Age: 23
End: 2023-11-08
Payer: COMMERCIAL

## 2023-11-08 DIAGNOSIS — S62.126D: ICD-10-CM

## 2023-11-08 DIAGNOSIS — G47.33 OSA (OBSTRUCTIVE SLEEP APNEA): Primary | ICD-10-CM

## 2023-11-08 DIAGNOSIS — S62.126D: Primary | ICD-10-CM

## 2023-11-08 PROCEDURE — 99213 OFFICE O/P EST LOW 20 MIN: CPT | Performed by: ORTHOPAEDIC SURGERY

## 2023-11-08 PROCEDURE — 73110 X-RAY EXAM OF WRIST: CPT | Mod: RT | Performed by: RADIOLOGY

## 2023-11-08 NOTE — LETTER
11/8/2023       RE: Raoul Schroeder  [address]  [facility] [address]    Dear Colleague,    Thank you for referring your patient, Raoul Schroeder, to the [facility] [place]. Please see a copy of my visit note below.    Raoul is here for follow-up of his right wrist.  He is doing well.  He has been out of the splint now completely for the last couple of weeks.  Getting back into his normal activities.  Occasionally some aching pain in the wrist but overall improvement.  No other injuries, no numbness, no tingling noted.    The past medical history was reviewed and documented in the chart.  This includes medications, surgeries, social history as well as review of systems.    Physical examination of the right wrist demonstrates no point tenderness dorsally or volarly over the lunate.  No wrist instability, Werner's is negative.  He has full flexion extension and rotation of the wrist, full radial and ulnar deviation also.  Bilaterally, no motor, no sensory deficits are noted.  No significant atrophy.  There is brisk capillary refill in all digits and a palpable radial pulse.  No overlying skin changes noted.    X-rays are taken today of the right wrist, AP, lateral, oblique.  The lunate volar lip fracture appears to be healing, normal anatomic alignment, no other bony abnormalities.    Impression:  Healing right wrist volar lunate lip fracture    Plan:  Raoul and I reviewed his x-rays together this morning.  I think is healing nicely.  I am okay with him returning to activities as tolerated.  We discussed a home exercise program and strengthening.  Exercises likely with the wrist in neutral will be fine, exercises with the wrist hyperextended might be uncomfortable for a period of time.  He can modify those accordingly.  Certainly no surgical indications.  He has our contact information and will follow-up with us on an as-needed basis.        Be Torres MD

## 2023-11-08 NOTE — NURSING NOTE
Reason For Visit:   Chief Complaint   Patient presents with    RECHECK     Follow-up on impacted lunate fracture of right hand DOI: 9/2/23       Primary MD: Bobbi Caldwell  Ref. MD: parish    Age: 23 year old    ?  No      There were no vitals taken for this visit.      Pain Assessment  Patient Currently in Pain: Yes  0-10 Pain Scale: 1  Primary Pain Location: Wrist (right)  Pain Descriptors: Other (comment), Tightness (weakness)    Hand Dominance Evaluation  Hand Dominance: Right          QuickDASH Assessment      11/8/2023     8:04 AM   QuickDASH Main   1. Open a tight or new jar Mild difficulty   2. Do heavy household chores (e.g., wash walls, floors) Mild difficulty   3. Carry a shopping bag or briefcase No difficulty   4. Wash your back No difficulty   5. Use a knife to cut food No difficulty   6. Recreational activities in which you take some force or impact through your arm, shoulder or hand (e.g., golf, hammering, tennis, etc.) Moderate difficulty   7. During the past week, to what extent has your arm, shoulder or hand problem interfered with your normal social activities with family, friends, neighbours or groups Moderately   8. During the past week, were you limited in your work or other regular daily activities as a result of your arm, shoulder or hand problem Not limited at all   9. Arm, shoulder or hand pain Mild   10.Tingling (pins and needles) in your arm,shoulder or hand None   11. During the past week, how much difficulty have you had sleeping because of the pain in your arm, shoulder or hand No difficulty   Quickdash Ability Score 15.91          No current outpatient medications on file.       No Known Allergies    Esther Lai, ATC

## 2023-11-08 NOTE — PROGRESS NOTES
Raoul is here for follow-up of his right wrist.  He is doing well.  He has been out of the splint now completely for the last couple of weeks.  Getting back into his normal activities.  Occasionally some aching pain in the wrist but overall improvement.  No other injuries, no numbness, no tingling noted.    The past medical history was reviewed and documented in the chart.  This includes medications, surgeries, social history as well as review of systems.    Physical examination of the right wrist demonstrates no point tenderness dorsally or volarly over the lunate.  No wrist instability, Werner's is negative.  He has full flexion extension and rotation of the wrist, full radial and ulnar deviation also.  Bilaterally, no motor, no sensory deficits are noted.  No significant atrophy.  There is brisk capillary refill in all digits and a palpable radial pulse.  No overlying skin changes noted.    X-rays are taken today of the right wrist, AP, lateral, oblique.  The lunate volar lip fracture appears to be healing, normal anatomic alignment, no other bony abnormalities.    Impression:  Healing right wrist volar lunate lip fracture    Plan:  Raoul and I reviewed his x-rays together this morning.  I think is healing nicely.  I am okay with him returning to activities as tolerated.  We discussed a home exercise program and strengthening.  Exercises likely with the wrist in neutral will be fine, exercises with the wrist hyperextended might be uncomfortable for a period of time.  He can modify those accordingly.  Certainly no surgical indications.  He has our contact information and will follow-up with us on an as-needed basis.

## 2024-04-02 ENCOUNTER — TRANSFERRED RECORDS (OUTPATIENT)
Dept: HEALTH INFORMATION MANAGEMENT | Facility: CLINIC | Age: 24
End: 2024-04-02
Payer: COMMERCIAL

## 2024-08-31 ENCOUNTER — HEALTH MAINTENANCE LETTER (OUTPATIENT)
Age: 24
End: 2024-08-31

## 2025-02-11 ENCOUNTER — PATIENT OUTREACH (OUTPATIENT)
Dept: CARE COORDINATION | Facility: CLINIC | Age: 25
End: 2025-02-11
Payer: COMMERCIAL